# Patient Record
Sex: MALE | Race: BLACK OR AFRICAN AMERICAN | URBAN - NONMETROPOLITAN AREA
[De-identification: names, ages, dates, MRNs, and addresses within clinical notes are randomized per-mention and may not be internally consistent; named-entity substitution may affect disease eponyms.]

---

## 2021-07-20 RX ORDER — SODIUM CHLORIDE 450 MG/100ML
INJECTION, SOLUTION INTRAVENOUS CONTINUOUS
Status: CANCELLED | OUTPATIENT
Start: 2021-07-20

## 2021-07-21 RX ORDER — SODIUM CHLORIDE 450 MG/100ML
INJECTION, SOLUTION INTRAVENOUS CONTINUOUS
OUTPATIENT
Start: 2021-07-21

## 2021-07-22 ENCOUNTER — HOSPITAL ENCOUNTER (OUTPATIENT)
Dept: CT IMAGING | Age: 51
Discharge: HOME | End: 2021-07-22
Payer: MEDICARE

## 2021-07-22 ENCOUNTER — HOSPITAL ENCOUNTER (OUTPATIENT)
Dept: GENERAL RADIOLOGY | Age: 51
Discharge: HOME | End: 2021-07-22
Payer: MEDICARE

## 2021-07-22 VITALS
DIASTOLIC BLOOD PRESSURE: 77 MMHG | OXYGEN SATURATION: 97 % | RESPIRATION RATE: 16 BRPM | TEMPERATURE: 96.8 F | HEART RATE: 71 BPM | SYSTOLIC BLOOD PRESSURE: 129 MMHG

## 2021-07-22 DIAGNOSIS — M54.16 LUMBAR RADICULOPATHY: ICD-10-CM

## 2021-07-22 PROCEDURE — 6360000004 HC RX CONTRAST MEDICATION: Performed by: ORTHOPAEDIC SURGERY

## 2021-07-22 PROCEDURE — 72132 CT LUMBAR SPINE W/DYE: CPT

## 2021-07-22 PROCEDURE — 72265 MYELOGRAPHY L-S SPINE: CPT

## 2021-07-22 PROCEDURE — 2580000003 HC RX 258: Performed by: RADIOLOGY

## 2021-07-22 RX ORDER — SODIUM CHLORIDE 450 MG/100ML
INJECTION, SOLUTION INTRAVENOUS CONTINUOUS
Status: DISCONTINUED | OUTPATIENT
Start: 2021-07-22 | End: 2021-07-23 | Stop reason: HOSPADM

## 2021-07-22 RX ADMIN — SODIUM CHLORIDE: 4.5 INJECTION, SOLUTION INTRAVENOUS at 10:46

## 2021-07-22 RX ADMIN — IOHEXOL 20 ML: 180 INJECTION INTRAVENOUS at 11:52

## 2021-07-22 ASSESSMENT — PAIN SCALES - GENERAL
PAINLEVEL_OUTOF10: 0
PAINLEVEL_OUTOF10: 5

## 2021-07-22 ASSESSMENT — PAIN DESCRIPTION - LOCATION: LOCATION: BACK;HIP

## 2021-07-22 ASSESSMENT — PAIN DESCRIPTION - ORIENTATION: ORIENTATION: LOWER;RIGHT

## 2021-07-22 ASSESSMENT — PAIN DESCRIPTION - PAIN TYPE: TYPE: CHRONIC PAIN

## 2021-07-22 NOTE — PROCEDURES
Lumbar Myelogram (with Radiologist)     Technique:     The prodecure and risks were explained to the patient. Informed consent was obtained. The patient was placed in a prone position on the fluoroscopy table. A lumbar intervertebral disc space was localized under fluoroscopy. Under sterile conditions, a lumbar puncture was performed using a 22-gauge spinal needle after the administration of local anesthetic with 2% lidocaine. The subarachnoid space was accessed without difficulty and showed immediate clear cerebrospinal fluid. Approximately 20 mL of omnipaque-180 was then injected under fluoroscopy. The thecal sac was well opacified. The needle was removed and a bandage was placed over the puncture site. There were no immediate complications and blood loss was minimal. The patient was transferred to CT in satisfactory condition. Impression: Unremarkable contrast injection with CT scan to follow.

## 2021-07-22 NOTE — PROGRESS NOTES
1030  Patient arrived to [facility] ambulatory for myelogram.  Oriented to room and call light  PT RIGHTS AND RESPONSIBILITIES OFFERED TO PT. He states he has been off coumadin for a week, he has a  and he has been NPO.      1215 Patient returned to [facility]. Vitals as charted. He denies pain. Dressing clean, dry, intact    1230 Vitals as charted. He denies pain. Dressing clean, dry, intact    1245 Vitals as charted. He denies pain. Dressing clean, dry, intact    1315 Vitals as charted. He denies pain. Dressing clean, dry, intact    1345 Vitals as charted. He denies pain. Dressing clean, dry, intact    1350  Iv removed. Discharge instructions given and explained and he denies questions.   Discharged ambulatory           __M__ Safety:       (Environmental)  Vivi Leventhal to environment   Ensure ID band is correct and in place/ allergy band as needed   Assess for fall risk   Initiate fall precautions as applicable (fall band, side rails, etc.)   Call light within reach   Bed in low position/ wheels locked    __M__ Pain:        Assess pain level and characteristics   Administer analgesics as ordered   Assess effectiveness of pain management and report to MD as needed    __M__ Knowledge Deficit:   Assess baseline knowledge   Provide teaching at level of understanding   Provide teaching via preferred learning method   Evaluate teaching effectiveness    __M__ Hemodynamic/Respiratory Status:       (Pre and Post Procedure Monitoring)   Assess/Monitor vital signs and LOC   Assess Baseline SpO2 prior to any sedation   Obtain weight/height   Assess vital signs/ LOC until patient meets discharge criteria   Monitor procedure site and notify MD of any issues